# Patient Record
Sex: FEMALE | Race: WHITE | NOT HISPANIC OR LATINO | Employment: UNEMPLOYED | URBAN - METROPOLITAN AREA
[De-identification: names, ages, dates, MRNs, and addresses within clinical notes are randomized per-mention and may not be internally consistent; named-entity substitution may affect disease eponyms.]

---

## 2018-03-07 NOTE — CONSULTS
Plan    1  Follow-up PRN Evaluation and Treatment  Follow-up  Status: Complete  Done:   [id number]    Assessment    1  Cervical lymphadenopathy (785 6) (R59 0)   2  Mononucleosis (075) (B27 90)   3  Rhinitis, chronic (472 0) (J31 0)    Chief Complaint  Chief Complaint Free Text Note Form: nasal congestion      History of Present Illness  HPI: Ms Floyd Sidhu presents today as a new patient due to nasal congestion and headaches  Complaints of fatigue and malaise persistent for the past 3 months  Symptoms occurred despite multiple antibiotics, nasal steroids, and antihistamines  No prior allergy testing  Symptoms consistent with seasonal allergies during the spring  Currently headaches are maxillary and frontal  Symptoms initially began with a severe sore throat  More recently diagnosed with strep and mono  PCP ordered previous lab evaluations including Lyme and Thyroid function tests  Review of Systems  Complete ENT ROS [facility]:   Eyes: itching of the eyes  Ears: No complaints of hearing loss, discharge, imbalance, recent ear infections, or tinnitus  Nose: obstruction and discharge or runny nose  Mouth: No sores in mouth, no altered taste, no dental problems  Throat: throat pain, difficulty swallowing and hoarseness  Neck: soreness and neck pain  Genitourinary: frequent urination  Cardiovascular: No complaints of chest pain or palpitations  Respiratory: cough  Gastrointestinal: No complaints of heartburn, nausea/vomiting, or constipation  Neurological: headache  Constitutional: No fever, feels well, no tiredness  Psychiatric: No anxiety, no depression, no sleep disturbances  Musculoskeletal: No complaints of arthralgias, myalgias or limb pain  Integumentary: No complaints of skin rash, itching or skin lesions  Endocrine: No complaints of proptosis, muscle weakness or feelings of weakness     Hematologic/Lymphatic: No complaints of swollen glands in the neck, does not bleed easily, does not bruise easily  ROS Reviewed:   ROS reviewed  Active Problems    1  Cough (786 2) (R05)   2  Difficulty swallowing (787 20) (R13 10)   3  Ear pain, bilateral (388 70) (H92 03)   4  Frequent urination (788 41) (R35 0)   5  Hoarse (784 42) (R49 0)   6  Itchy eyes (379 99) (H57 8)   7  Nasal discharge (478 19) (J34 89)   8  Neck swelling (784 2) (R22 1)   9  Sinus headache (784 0) (R51)   10  Sore neck (723 1) (M54 2)   11  Sore throat (462) (J02 9)    Past Medical History    1  History of Environmental allergies (V15 09) (Z91 09)  Past Medical History Reviewed: The past medical history was reviewed and updated today  Surgical History  Surgical History Reviewed: The surgical history was reviewed and updated today  Family History  Child    1  No pertinent family history  Family History Reviewed: The family history was reviewed and updated today  Social History    · Denied: History of Alcohol use   · Never a smoker  Social History Reviewed: The social history was reviewed and updated today  Current Meds   1  Claritin CAPS; Therapy: (Maureen Martinez) to Recorded   2  Flonase 50 MCG/ACT SUSP; Therapy: (Recorded:[id number]) to Recorded    Allergies    1  Amoxicillin CAPS   2  Augmentin XR TB12    Vitals  Signs   Recorded: [id number] 20:85NL   Systolic: 391  Diastolic: 70  Height: 5 ft 4 in  Weight: 150 lb   BMI Calculated: 25 75  BSA Calculated: 1 73  BMI Percentile: 82 %  2-20 Stature Percentile: 45 %  2-20 Weight Percentile: 80 %    Physical Exam    Constitutional:   General appearance: Well developed, well nourished  Ability to communicate: Voice normal  Speech normal    Head and Face:   Head and face: Head normocephalic, atraumatic with no lesions or palpable masses  Submandibular glands and parotid glands: non tender, no masses  Eyes:   Test of Ocular Motility: Gaze normal  No nystagmus     Ears:   Otoscopic Examination: Tympanic membranes intact and normal in appearance, no retraction of tympanic membranes observed, no serous effusion observed, no evidence of tympanosclerosis  Hearing: Normal    Nose:   External auditory canals: No cerumen impaction noted, no drainage observed, no edema noted in EAC, no exostoses present, no osteoma present, no tenderness noted  External Inspection of Nose: No deformities observed, no deviation of bone structure, no skin lesion present, no swelling present  Nares are symmetric, no deviation of caudal portion of septum  Nasal Mucosa: No congestion observed, no mucosal lesion or masses present, no ulcerations observed  Cartilaginous Septum: midline, no bleeding noted, no crusting present, no perforation noted  Turbinates: No hypertrophy or inflammation noted  unable to visualize due to gag reflex  Mouth: Inspection of Lips, Teeth, Gums: Lips normal in color, moist, no cracks or lesions  No loose teeth, no missing teeth  Gingiva: no bleeding observed, no inflammation present  Hard Palate: no asymmetry observed, no torus present  Soft palate normal with no ulcers noted  Throat:   Examination of Oropharynx: Oral Mucosa: no masses, lesions, leukoplakia, or scarring  Normal StensonÃ¢â¬â¢s ducts, pink and moist, no discoloration noted  Floor of mouth: normal WarthinÃ¢â¬â¢s ducts, no lesions, ulcerations, leukoplakia or torus mandibularis  Tonsils: no hypertrophy or ulcerations noted  Tongue: normal mobility, surfaces without fissures, leukoplakia, ulceration or masses, not enlarged, no pallor noted, no white patches present  tonsils 1+ to 2+  unable to visualize due to gag reflex  Neck:   Neck: Abnormal  enlarged level 2 node on right  Examination of Thyroid: Normal size, non-tender, no palpable masses  Pulmonary:   Respiratory effort: Normal respiratory rate and rhythm, no increased work of breathing     Cardiovascular:   Inspection of Peripheral vascular system by observation: Normal    Lymphatic:   Palpation of Lymph Nodes: Neck: Abnormal  enlarged level 2 node on right  Neurological/Psychiatric:   Cranial nerves II-VII grossly intact  Oriented to person, place, and time  Cooperative, in no acute distress  Discussion/Summary  Discussion Summary:   Ms Susan Patricia presented today as a new patient due to nasal congestion, sore throat, fatigue that has been persistent over the past few months  She has been following her PCP and was recently diagnosed with strep and mononucleosis  We reviewed her lab results and medical management  We discussed options including nasal steroids, saline irrigation, oral steroids, allergy testing, and observation of symptoms as she continues to recover  We agreed on oral steroid taper and follow up as needed if symptoms persist    observation as continues to recover from mono        Signatures   Electronically signed by : ZACARIAS Lilly ; Aug 24 2016  9:39AM EST                       (Author)

## 2018-07-17 ENCOUNTER — OFFICE VISIT (OUTPATIENT)
Dept: OTOLARYNGOLOGY | Facility: CLINIC | Age: 23
End: 2018-07-17
Payer: COMMERCIAL

## 2018-07-17 VITALS
WEIGHT: 159.6 LBS | DIASTOLIC BLOOD PRESSURE: 72 MMHG | BODY MASS INDEX: 28.28 KG/M2 | HEIGHT: 63 IN | SYSTOLIC BLOOD PRESSURE: 120 MMHG

## 2018-07-17 DIAGNOSIS — J35.1 CHRONIC TONSILLAR HYPERTROPHY: ICD-10-CM

## 2018-07-17 DIAGNOSIS — R59.0 LYMPHADENOPATHY, ANTERIOR CERVICAL: ICD-10-CM

## 2018-07-17 DIAGNOSIS — J35.01 CHRONIC TONSILLITIS: Primary | Chronic | ICD-10-CM

## 2018-07-17 PROCEDURE — 99203 OFFICE O/P NEW LOW 30 MIN: CPT | Performed by: SPECIALIST

## 2018-07-17 RX ORDER — LORATADINE 10 MG/1
CAPSULE, LIQUID FILLED ORAL
COMMUNITY

## 2018-07-17 RX ORDER — CLARITHROMYCIN 500 MG/1
500 TABLET, COATED ORAL DAILY
COMMUNITY
Start: 2018-07-12

## 2018-07-17 RX ORDER — AMOXICILLIN AND CLAVULANATE POTASSIUM 875; 125 MG/1; MG/1
TABLET, FILM COATED ORAL
COMMUNITY
Start: 2018-06-29

## 2018-07-17 RX ORDER — AMOXICILLIN 875 MG/1
TABLET, COATED ORAL
COMMUNITY
Start: 2018-06-16

## 2018-07-17 RX ORDER — FLUTICASONE PROPIONATE 50 MCG
SPRAY, SUSPENSION (ML) NASAL
COMMUNITY

## 2018-07-17 RX ORDER — METHYLPREDNISOLONE 4 MG/1
TABLET ORAL
Qty: 21 TABLET | Refills: 0 | Status: SHIPPED | OUTPATIENT
Start: 2018-07-17

## 2018-07-17 RX ORDER — OXYCODONE HCL 5 MG/5 ML
5 SOLUTION, ORAL ORAL EVERY 6 HOURS PRN
Qty: 100 ML | Refills: 0 | Status: SHIPPED | OUTPATIENT
Start: 2018-07-17

## 2018-07-17 RX ORDER — PREDNISONE 20 MG/1
TABLET ORAL
COMMUNITY
Start: 2018-06-16

## 2018-07-17 RX ORDER — NORGESTIMATE AND ETHINYL ESTRADIOL 0.25-0.035
KIT ORAL
COMMUNITY
Start: 2018-05-09

## 2018-07-17 NOTE — ASSESSMENT & PLAN NOTE
Ms Carmen Clark returns with tonsillitis once again, and she has not had any episodes since she previously suffered from tonsillitis and mononucleosis 2 years ago  Her current episode is severe, with persistent bilateral pain, cervical adenopathy, and large tonsils, in spite of 3 courses of antibiotics, and a burst of steroid  We discussed management options at length, and reviewed criteria for tonsillectomy and adenoidectomy  Given the severity and 5 week duration of this most recent episode, as well as the previous episode of equal severity, I believe tonsillectomy and adenoidectomy represents the most reasonable choice, and we agree to proceed  Given her symptom severity, we agree to treat with a steroid taper to try to provide some symptomatic relief while awaiting surgery

## 2018-07-24 ENCOUNTER — TELEPHONE (OUTPATIENT)
Dept: OTOLARYNGOLOGY | Facility: CLINIC | Age: 23
End: 2018-07-24

## 2018-07-24 NOTE — TELEPHONE ENCOUNTER
Spoke with patient to discuss surgery dates  Patient will be scheduled for August 14th at [facility]  Patient stated that she has finished the antibiotics and steroids and says that she is starting to feel sick and groggy again  She is looking for what she can do in the mean time? Patient can be reached at [phone]   Please advise

## 2018-08-02 PROBLEM — J35.1 CHRONIC TONSILLAR HYPERTROPHY: Status: ACTIVE | Noted: 2018-08-02

## 2018-08-02 NOTE — PROGRESS NOTES
Assessment/Plan:    Chronic tonsillitis  Ms Richelle Pathak returns with tonsillitis once again, and she has not had any episodes since she previously suffered from tonsillitis and mononucleosis 2 years ago  Her current episode is severe, with persistent bilateral pain, cervical adenopathy, and large tonsils, in spite of 3 courses of antibiotics, and a burst of steroid  We discussed management options at length, and reviewed criteria for tonsillectomy and adenoidectomy  Given the severity and 5 week duration of this most recent episode, as well as the previous episode of equal severity, I believe tonsillectomy and adenoidectomy represents the most reasonable choice, and we agree to proceed  Given her symptom severity, we agree to treat with a steroid taper to try to provide some symptomatic relief while awaiting surgery  Diagnoses and all orders for this visit:    Chronic tonsillitis  -     Methylprednisolone 4 MG TBPK; Use as directed on package  -     oxyCODONE (ROXICODONE) 5 mg/5 mL solution; Take 5 mL (5 mg total) by mouth every 6 (six) hours as needed for moderate pain Max Daily Amount: 20 mg    Lymphadenopathy, anterior cervical    Chronic tonsillar hypertrophy    Other orders  -     clarithromycin (BIAXIN) 500 mg tablet; Take 500 mg by mouth daily  -     amoxicillin (AMOXIL) 875 mg tablet;   -     amoxicillin-clavulanate (AUGMENTIN) 875-125 mg per tablet;   -     Loratadine (CLARITIN) 10 MG CAPS; Take by mouth  -     fluticasone (FLONASE) 50 mcg/act nasal spray; into each nostril  -     predniSONE 20 mg tablet;   -     MONO-LINYAH 0 25-35 MG-MCG per tablet;           Subjective:      Patient ID: Mikki Pride is a 25 y o  female  Ms Richelle Pathak presents today as a new patient due to chronic sore throat  Symptoms gradually worsening  Experienced several sore throat each year for past couple of years and has history of mono  No change in voice or swallow            The following portions of the patient's history were reviewed and updated as appropriate: allergies, current medications, past family history, past medical history, past social history, past surgical history and problem list     Review of Systems   Constitutional: Negative for activity change and fatigue  HENT: Positive for sore throat  Negative for congestion, ear discharge, ear pain, hearing loss, postnasal drip, sinus pain, sinus pressure and tinnitus  Eyes: Negative  Respiratory: Negative for cough and shortness of breath  Cardiovascular: Negative for chest pain  Gastrointestinal: Negative  Endocrine: Negative  Genitourinary: Negative  Musculoskeletal: Negative for gait problem, neck pain and neck stiffness  Skin: Negative for color change and pallor  Allergic/Immunologic: Negative  Neurological: Negative for dizziness, speech difficulty, light-headedness and headaches  Hematological: Negative  Psychiatric/Behavioral: Negative  Objective:      /72 (BP Location: Left arm, Patient Position: Sitting, Cuff Size: Adult)   Ht 5' 3" (1 6 m)   Wt 72 4 kg (159 lb 9 6 oz)   BMI 28 27 kg/m²          Physical Exam   Constitutional: She is oriented to person, place, and time  She appears well-developed and well-nourished  HENT:   Head: Normocephalic  Right Ear: Hearing, tympanic membrane, external ear and ear canal normal    Left Ear: Hearing, tympanic membrane, external ear and ear canal normal    Nose: Nose normal    Mouth/Throat: Uvula is midline, oropharynx is clear and moist and mucous membranes are normal  No oropharyngeal exudate  Tonsillar tissue 2 + hypertrophy   Neck: Normal range of motion  Neck supple  Normal carotid pulses and no JVD present  Carotid bruit is not present  No tracheal deviation present  No thyromegaly present  Cardiovascular: Normal rate  Pulmonary/Chest: Effort normal  No respiratory distress  Musculoskeletal: Normal range of motion     Lymphadenopathy:     She has no cervical adenopathy  Neurological: She is alert and oriented to person, place, and time  Skin: Skin is warm and dry  Psychiatric: She has a normal mood and affect   Her behavior is normal